# Patient Record
Sex: FEMALE | Race: BLACK OR AFRICAN AMERICAN | Employment: FULL TIME | URBAN - METROPOLITAN AREA
[De-identification: names, ages, dates, MRNs, and addresses within clinical notes are randomized per-mention and may not be internally consistent; named-entity substitution may affect disease eponyms.]

---

## 2019-07-17 ENCOUNTER — HOSPITAL ENCOUNTER (EMERGENCY)
Age: 39
Discharge: HOME | End: 2019-07-17
Attending: EMERGENCY MEDICINE
Payer: MEDICAID

## 2019-07-17 VITALS
WEIGHT: 173.06 LBS | DIASTOLIC BLOOD PRESSURE: 82 MMHG | OXYGEN SATURATION: 100 % | BODY MASS INDEX: 30.66 KG/M2 | SYSTOLIC BLOOD PRESSURE: 123 MMHG | RESPIRATION RATE: 18 BRPM | HEIGHT: 63 IN | HEART RATE: 74 BPM | TEMPERATURE: 98.8 F

## 2019-07-17 DIAGNOSIS — H65.02 ACUTE SEROUS OTITIS MEDIA OF LEFT EAR, RECURRENCE NOT SPECIFIED: Primary | ICD-10-CM

## 2019-07-17 LAB — S PYO AG THROAT QL: NEGATIVE

## 2019-07-17 PROCEDURE — 99283 EMERGENCY DEPT VISIT LOW MDM: CPT

## 2019-07-17 PROCEDURE — 6370000000 HC RX 637 (ALT 250 FOR IP): Performed by: EMERGENCY MEDICINE

## 2019-07-17 PROCEDURE — 87880 STREP A ASSAY W/OPTIC: CPT

## 2019-07-17 PROCEDURE — 87081 CULTURE SCREEN ONLY: CPT

## 2019-07-17 RX ORDER — AMOXICILLIN 250 MG/1
500 CAPSULE ORAL ONCE
Status: COMPLETED | OUTPATIENT
Start: 2019-07-17 | End: 2019-07-17

## 2019-07-17 RX ORDER — FLUCONAZOLE 150 MG/1
150 TABLET ORAL ONCE
Qty: 2 TABLET | Refills: 0 | Status: SHIPPED | OUTPATIENT
Start: 2019-07-17 | End: 2019-07-17

## 2019-07-17 RX ORDER — ACETAMINOPHEN 325 MG/1
650 TABLET ORAL ONCE
Status: COMPLETED | OUTPATIENT
Start: 2019-07-17 | End: 2019-07-17

## 2019-07-17 RX ORDER — AMOXICILLIN 500 MG/1
500 CAPSULE ORAL 3 TIMES DAILY
Qty: 30 CAPSULE | Refills: 0 | Status: SHIPPED | OUTPATIENT
Start: 2019-07-17 | End: 2019-07-27

## 2019-07-17 RX ADMIN — AMOXICILLIN 500 MG: 250 CAPSULE ORAL at 18:57

## 2019-07-17 RX ADMIN — ACETAMINOPHEN 650 MG: 325 TABLET ORAL at 18:57

## 2019-07-17 ASSESSMENT — PAIN SCALES - GENERAL
PAINLEVEL_OUTOF10: 8

## 2019-07-17 ASSESSMENT — ENCOUNTER SYMPTOMS
SHORTNESS OF BREATH: 0
DIARRHEA: 0
TROUBLE SWALLOWING: 0
VOICE CHANGE: 0
SORE THROAT: 1
VOMITING: 0
NAUSEA: 0

## 2019-07-17 ASSESSMENT — PAIN DESCRIPTION - PAIN TYPE
TYPE: ACUTE PAIN
TYPE: ACUTE PAIN

## 2019-07-17 ASSESSMENT — PAIN DESCRIPTION - DESCRIPTORS
DESCRIPTORS: BURNING;TENDER
DESCRIPTORS: ACHING;TENDER;THROBBING

## 2019-07-17 ASSESSMENT — PAIN DESCRIPTION - LOCATION
LOCATION: EAR;THROAT
LOCATION: EAR;THROAT

## 2019-07-17 ASSESSMENT — PAIN DESCRIPTION - ONSET
ONSET: GRADUAL
ONSET: GRADUAL

## 2019-07-17 ASSESSMENT — PAIN DESCRIPTION - FREQUENCY
FREQUENCY: CONTINUOUS
FREQUENCY: CONTINUOUS

## 2019-07-17 ASSESSMENT — PAIN DESCRIPTION - PROGRESSION
CLINICAL_PROGRESSION: NOT CHANGED
CLINICAL_PROGRESSION: GRADUALLY WORSENING

## 2019-07-17 ASSESSMENT — PAIN DESCRIPTION - ORIENTATION
ORIENTATION: LEFT
ORIENTATION: LEFT

## 2019-07-17 ASSESSMENT — PAIN - FUNCTIONAL ASSESSMENT: PAIN_FUNCTIONAL_ASSESSMENT: 0-10

## 2019-07-17 NOTE — ED PROVIDER NOTES
TUBAL LIGATION           CURRENT MEDICATIONS       Previous Medications    ALBUTEROL SULFATE HFA (PROVENTIL HFA) 108 (90 BASE) MCG/ACT INHALER    Inhale 2 puffs into the lungs every 6 hours as needed for Wheezing or Shortness of Breath May substitute ProAir MDI    MULTIPLE VITAMINS-MINERALS (THERAPEUTIC MULTIVITAMIN-MINERALS) TABLET    Take 1 tablet by mouth daily    PSEUDOEPHEDRINE-GUAIFENESIN (MUCINEX D)  MG PER EXTENDED RELEASE TABLET    Take 2 tablets by mouth every 12 hours as needed for Congestion       ALLERGIES     Percocet [oxycodone-acetaminophen] and Vicodin [hydrocodone-acetaminophen]    FAMILY HISTORY     History reviewed. No pertinent family history.        SOCIAL HISTORY       Social History     Socioeconomic History    Marital status: Single     Spouse name: None    Number of children: None    Years of education: None    Highest education level: None   Occupational History    None   Social Needs    Financial resource strain: None    Food insecurity:     Worry: None     Inability: None    Transportation needs:     Medical: None     Non-medical: None   Tobacco Use    Smoking status: Never Smoker    Smokeless tobacco: Never Used   Substance and Sexual Activity    Alcohol use: Yes     Comment: once a month    Drug use: No    Sexual activity: Yes     Partners: Male   Lifestyle    Physical activity:     Days per week: None     Minutes per session: None    Stress: None   Relationships    Social connections:     Talks on phone: None     Gets together: None     Attends Jewish service: None     Active member of club or organization: None     Attends meetings of clubs or organizations: None     Relationship status: None    Intimate partner violence:     Fear of current or ex partner: None     Emotionally abused: None     Physically abused: None     Forced sexual activity: None   Other Topics Concern    None   Social History Narrative    None         PHYSICAL EXAM    (up to 7 for level

## 2019-07-19 LAB — S PYO THROAT QL CULT: NORMAL

## 2019-08-01 ENCOUNTER — HOSPITAL ENCOUNTER (EMERGENCY)
Age: 39
Discharge: HOME | End: 2019-08-01
Attending: EMERGENCY MEDICINE
Payer: MEDICAID

## 2019-08-01 VITALS
WEIGHT: 170 LBS | RESPIRATION RATE: 16 BRPM | BODY MASS INDEX: 30.12 KG/M2 | HEIGHT: 63 IN | SYSTOLIC BLOOD PRESSURE: 131 MMHG | OXYGEN SATURATION: 99 % | HEART RATE: 81 BPM | TEMPERATURE: 98 F | DIASTOLIC BLOOD PRESSURE: 84 MMHG

## 2019-08-01 DIAGNOSIS — K08.89 DENTALGIA: Primary | ICD-10-CM

## 2019-08-01 PROCEDURE — 99282 EMERGENCY DEPT VISIT SF MDM: CPT

## 2019-08-01 PROCEDURE — 6370000000 HC RX 637 (ALT 250 FOR IP): Performed by: EMERGENCY MEDICINE

## 2019-08-01 PROCEDURE — 96372 THER/PROPH/DIAG INJ SC/IM: CPT

## 2019-08-01 PROCEDURE — 6360000002 HC RX W HCPCS: Performed by: EMERGENCY MEDICINE

## 2019-08-01 RX ORDER — PENICILLIN V POTASSIUM 250 MG/1
500 TABLET ORAL ONCE
Status: COMPLETED | OUTPATIENT
Start: 2019-08-01 | End: 2019-08-01

## 2019-08-01 RX ORDER — IBUPROFEN 600 MG/1
600 TABLET ORAL EVERY 6 HOURS PRN
Qty: 30 TABLET | Refills: 0 | Status: SHIPPED | OUTPATIENT
Start: 2019-08-01 | End: 2020-05-17

## 2019-08-01 RX ORDER — OXYCODONE HYDROCHLORIDE AND ACETAMINOPHEN 5; 325 MG/1; MG/1
1 TABLET ORAL EVERY 6 HOURS PRN
Qty: 4 TABLET | Refills: 0 | Status: SHIPPED | OUTPATIENT
Start: 2019-08-01 | End: 2019-08-04

## 2019-08-01 RX ORDER — KETOROLAC TROMETHAMINE 30 MG/ML
30 INJECTION, SOLUTION INTRAMUSCULAR; INTRAVENOUS ONCE
Status: COMPLETED | OUTPATIENT
Start: 2019-08-01 | End: 2019-08-01

## 2019-08-01 RX ORDER — PENICILLIN V POTASSIUM 500 MG/1
500 TABLET ORAL 4 TIMES DAILY
Qty: 40 TABLET | Refills: 0 | Status: SHIPPED | OUTPATIENT
Start: 2019-08-01 | End: 2019-08-11

## 2019-08-01 RX ADMIN — KETOROLAC TROMETHAMINE 30 MG: 30 INJECTION, SOLUTION INTRAMUSCULAR at 05:22

## 2019-08-01 RX ADMIN — PENICILLIN V POTASIUM 500 MG: 250 TABLET ORAL at 05:22

## 2019-08-01 ASSESSMENT — PAIN DESCRIPTION - ONSET
ONSET: GRADUAL
ONSET: GRADUAL

## 2019-08-01 ASSESSMENT — PAIN - FUNCTIONAL ASSESSMENT
PAIN_FUNCTIONAL_ASSESSMENT: ACTIVITIES ARE NOT PREVENTED
PAIN_FUNCTIONAL_ASSESSMENT: 0-10
PAIN_FUNCTIONAL_ASSESSMENT: ACTIVITIES ARE NOT PREVENTED

## 2019-08-01 ASSESSMENT — PAIN DESCRIPTION - FREQUENCY
FREQUENCY: CONTINUOUS
FREQUENCY: CONTINUOUS

## 2019-08-01 ASSESSMENT — PAIN DESCRIPTION - PROGRESSION
CLINICAL_PROGRESSION: RAPIDLY IMPROVING
CLINICAL_PROGRESSION: NOT CHANGED

## 2019-08-01 ASSESSMENT — PAIN SCALES - GENERAL
PAINLEVEL_OUTOF10: 10
PAINLEVEL_OUTOF10: 7
PAINLEVEL_OUTOF10: 10

## 2019-08-01 ASSESSMENT — PAIN DESCRIPTION - ORIENTATION
ORIENTATION: LEFT;LOWER
ORIENTATION: LEFT;LOWER

## 2019-08-01 ASSESSMENT — PAIN DESCRIPTION - PAIN TYPE
TYPE: ACUTE PAIN
TYPE: ACUTE PAIN

## 2019-08-01 ASSESSMENT — PAIN DESCRIPTION - LOCATION
LOCATION: TEETH
LOCATION: TEETH

## 2019-08-01 ASSESSMENT — PAIN DESCRIPTION - DESCRIPTORS
DESCRIPTORS: ACHING
DESCRIPTORS: ACHING

## 2019-08-01 NOTE — ED PROVIDER NOTES
Triage Chief Complaint:    Dental Pain (left lower)    [place]:  Rosemary Gooden is a 41 y.o. female that presents to the emergency department complaints of having left jaw pain. The patient has a history of having tooth problems, and states that she tried to have the tooth is infected at the present time it removed by a dentist.  Patient states that the dentist could not remove it and tried to \"push it back into place\". Patient states that she is having pain in that area at the present time is having difficulty chewing and swallowing. Patient states it hurts to do so. Patient denies any difficulty breathing. .    ROS:  At least 10 systems reviewed and otherwise acutely negative except as in the [address] Past Medical History:   Diagnosis Date    Anemia     Thyroid nodule      Past Surgical History:   Procedure Laterality Date     SECTION  2011    TUBAL LIGATION       History reviewed. No pertinent family history.   Social History     Socioeconomic History    Marital status: Single     Spouse name: Not on file    Number of children: Not on file    Years of education: Not on file    Highest education level: Not on file   Occupational History    Not on file   Social Needs    Financial resource strain: Not on file    Food insecurity:     Worry: Not on file     Inability: Not on file    Transportation needs:     Medical: Not on file     Non-medical: Not on file   Tobacco Use    Smoking status: Never Smoker    Smokeless tobacco: Never Used   Substance and Sexual Activity    Alcohol use: Yes     Comment: once a month    Drug use: No    Sexual activity: Yes     Partners: Male   Lifestyle    Physical activity:     Days per week: Not on file     Minutes per session: Not on file    Stress: Not on file   Relationships    Social connections:     Talks on phone: Not on file     Gets together: Not on file     Attends Anabaptism service: Not on file     Active member of club or organization: Not on file

## 2020-05-17 ENCOUNTER — HOSPITAL ENCOUNTER (EMERGENCY)
Age: 40
Discharge: HOME | End: 2020-05-17
Attending: EMERGENCY MEDICINE
Payer: MEDICAID

## 2020-05-17 VITALS
TEMPERATURE: 98.7 F | DIASTOLIC BLOOD PRESSURE: 75 MMHG | HEIGHT: 63 IN | BODY MASS INDEX: 30 KG/M2 | WEIGHT: 169.31 LBS | RESPIRATION RATE: 12 BRPM | SYSTOLIC BLOOD PRESSURE: 122 MMHG | HEART RATE: 75 BPM | OXYGEN SATURATION: 99 %

## 2020-05-17 LAB
AMORPHOUS: ABNORMAL /HPF
BACTERIA: ABNORMAL /HPF
BILIRUBIN URINE: ABNORMAL
BLOOD, URINE: ABNORMAL
CLARITY: ABNORMAL
COLOR: ABNORMAL
EPITHELIAL CELLS, UA: ABNORMAL /HPF (ref 0–5)
GLUCOSE URINE: 100 MG/DL
HCG(URINE) PREGNANCY TEST: NEGATIVE
KETONES, URINE: NEGATIVE MG/DL
LEUKOCYTE ESTERASE, URINE: ABNORMAL
MICROSCOPIC EXAMINATION: YES
MUCUS: ABNORMAL /LPF
NITRITE, URINE: POSITIVE
PH UA: 5.5 (ref 5–8)
PROTEIN UA: 30 MG/DL
RBC UA: ABNORMAL /HPF (ref 0–4)
SPECIFIC GRAVITY UA: >=1.03 (ref 1–1.03)
URINE REFLEX TO CULTURE: YES
URINE TYPE: ABNORMAL
UROBILINOGEN, URINE: 2 E.U./DL
WBC UA: >100 /HPF (ref 0–5)

## 2020-05-17 PROCEDURE — 99283 EMERGENCY DEPT VISIT LOW MDM: CPT

## 2020-05-17 PROCEDURE — 81001 URINALYSIS AUTO W/SCOPE: CPT

## 2020-05-17 PROCEDURE — 84703 CHORIONIC GONADOTROPIN ASSAY: CPT

## 2020-05-17 PROCEDURE — 6370000000 HC RX 637 (ALT 250 FOR IP): Performed by: EMERGENCY MEDICINE

## 2020-05-17 PROCEDURE — 87086 URINE CULTURE/COLONY COUNT: CPT

## 2020-05-17 RX ORDER — IBUPROFEN 600 MG/1
600 TABLET ORAL EVERY 8 HOURS PRN
Qty: 20 TABLET | Refills: 0 | OUTPATIENT
Start: 2020-05-17 | End: 2021-03-28

## 2020-05-17 RX ORDER — CIPROFLOXACIN 500 MG/1
500 TABLET, FILM COATED ORAL ONCE
Status: COMPLETED | OUTPATIENT
Start: 2020-05-17 | End: 2020-05-17

## 2020-05-17 RX ORDER — CIPROFLOXACIN 500 MG/1
500 TABLET, FILM COATED ORAL 2 TIMES DAILY
Qty: 20 TABLET | Refills: 0 | Status: SHIPPED | OUTPATIENT
Start: 2020-05-17 | End: 2020-05-27

## 2020-05-17 RX ORDER — ACETAMINOPHEN 325 MG/1
650 TABLET ORAL EVERY 6 HOURS PRN
Qty: 49 TABLET | Refills: 3 | OUTPATIENT
Start: 2020-05-17 | End: 2021-03-28

## 2020-05-17 RX ADMIN — CIPROFLOXACIN 500 MG: 500 TABLET, FILM COATED ORAL at 08:45

## 2020-05-17 ASSESSMENT — ENCOUNTER SYMPTOMS
EYE REDNESS: 0
CHOKING: 0
EYE PAIN: 0
ABDOMINAL DISTENTION: 0
PHOTOPHOBIA: 0
SHORTNESS OF BREATH: 0
APNEA: 0
STRIDOR: 0
CHEST TIGHTNESS: 0
COUGH: 0
EYE DISCHARGE: 0
WHEEZING: 0
EYE ITCHING: 0

## 2020-05-17 NOTE — ED PROVIDER NOTES
the review of systems was reviewed and negative. PAST MEDICAL HISTORY     Past Medical History:   Diagnosis Date    Anemia     Thyroid nodule        SURGICAL HISTORY       Past Surgical History:   Procedure Laterality Date     SECTION  2011    TUBAL LIGATION         CURRENT MEDICATIONS       Previous Medications    MULTIPLE VITAMINS-MINERALS (THERAPEUTIC MULTIVITAMIN-MINERALS) TABLET    Take 1 tablet by mouth daily       ALLERGIES     Percocet [oxycodone-acetaminophen] and Vicodin [hydrocodone-acetaminophen]    FAMILY HISTORY      History reviewed. No pertinent family history.     SOCIAL HISTORY       Social History     Socioeconomic History    Marital status: Single     Spouse name: None    Number of children: None    Years of education: None    Highest education level: None   Occupational History    None   Social Needs    Financial resource strain: None    Food insecurity     Worry: None     Inability: None    Transportation needs     Medical: None     Non-medical: None   Tobacco Use    Smoking status: Never Smoker    Smokeless tobacco: Never Used   Substance and Sexual Activity    Alcohol use: Yes     Comment: once a month    Drug use: No    Sexual activity: Yes     Partners: Male   Lifestyle    Physical activity     Days per week: None     Minutes per session: None    Stress: None   Relationships    Social connections     Talks on phone: None     Gets together: None     Attends Jainism service: None     Active member of club or organization: None     Attends meetings of clubs or organizations: None     Relationship status: None    Intimate partner violence     Fear of current or ex partner: None     Emotionally abused: None     Physically abused: None     Forced sexual activity: None   Other Topics Concern    None   Social History Narrative    None       PHYSICAL EXAM       ED Triage Vitals [20 0829]   BP Temp Temp Source Pulse Resp SpO2 Height Weight   122/75 98.7 °F

## 2020-05-18 LAB — URINE CULTURE, ROUTINE: NORMAL

## 2020-06-22 ENCOUNTER — TELEPHONE (OUTPATIENT)
Dept: GYNECOLOGY | Age: 40
End: 2020-06-22

## 2020-06-25 ENCOUNTER — TELEPHONE (OUTPATIENT)
Dept: GYNECOLOGY | Age: 40
End: 2020-06-25

## 2020-06-25 NOTE — TELEPHONE ENCOUNTER
Called patient, left a message for a return call. Called patient to get her on schedule to be seen next week.

## 2020-06-25 NOTE — TELEPHONE ENCOUNTER
Patient returned Tigist's phone call and was scheduled for Monday, June 29th at 10:40 am in [place].

## 2020-06-29 ENCOUNTER — OFFICE VISIT (OUTPATIENT)
Dept: GYNECOLOGY | Age: 40
End: 2020-06-29
Payer: MEDICAID

## 2020-06-29 VITALS
HEIGHT: 63 IN | RESPIRATION RATE: 17 BRPM | DIASTOLIC BLOOD PRESSURE: 69 MMHG | TEMPERATURE: 97.7 F | SYSTOLIC BLOOD PRESSURE: 126 MMHG | WEIGHT: 166 LBS | HEART RATE: 65 BPM | BODY MASS INDEX: 29.41 KG/M2

## 2020-06-29 DIAGNOSIS — R23.2 HOT FLASHES: ICD-10-CM

## 2020-06-29 LAB
A/G RATIO: 1.9 (ref 1.1–2.2)
ALBUMIN SERPL-MCNC: 4.9 G/DL (ref 3.4–5)
ALP BLD-CCNC: 102 U/L (ref 40–129)
ALT SERPL-CCNC: 38 U/L (ref 10–40)
ANION GAP SERPL CALCULATED.3IONS-SCNC: 15 MMOL/L (ref 3–16)
AST SERPL-CCNC: 75 U/L (ref 15–37)
BILIRUB SERPL-MCNC: 0.3 MG/DL (ref 0–1)
BUN BLDV-MCNC: 9 MG/DL (ref 7–20)
CALCIUM SERPL-MCNC: 9.6 MG/DL (ref 8.3–10.6)
CHLORIDE BLD-SCNC: 101 MMOL/L (ref 99–110)
CO2: 25 MMOL/L (ref 21–32)
CREAT SERPL-MCNC: 0.5 MG/DL (ref 0.6–1.1)
ESTRADIOL LEVEL: 115 PG/ML
FOLLICLE STIMULATING HORMONE: 4.3 MIU/ML
GFR AFRICAN AMERICAN: >60
GFR NON-AFRICAN AMERICAN: >60
GLOBULIN: 2.6 G/DL
GLUCOSE BLD-MCNC: 90 MG/DL (ref 70–99)
HCT VFR BLD CALC: 39 % (ref 36–48)
HEMOGLOBIN: 12.9 G/DL (ref 12–16)
MCH RBC QN AUTO: 29.3 PG (ref 26–34)
MCHC RBC AUTO-ENTMCNC: 33.1 G/DL (ref 31–36)
MCV RBC AUTO: 88.7 FL (ref 80–100)
PDW BLD-RTO: 14.5 % (ref 12.4–15.4)
PLATELET # BLD: 285 K/UL (ref 135–450)
PMV BLD AUTO: 8.3 FL (ref 5–10.5)
POTASSIUM SERPL-SCNC: 4.6 MMOL/L (ref 3.5–5.1)
PROGESTERONE LEVEL: <0.05 NG/ML
RBC # BLD: 4.4 M/UL (ref 4–5.2)
SODIUM BLD-SCNC: 141 MMOL/L (ref 136–145)
TOTAL PROTEIN: 7.5 G/DL (ref 6.4–8.2)
TSH SERPL DL<=0.05 MIU/L-ACNC: 1.05 UIU/ML (ref 0.27–4.2)
WBC # BLD: 6.4 K/UL (ref 4–11)

## 2020-06-29 PROCEDURE — 99395 PREV VISIT EST AGE 18-39: CPT | Performed by: OBSTETRICS & GYNECOLOGY

## 2020-06-29 ASSESSMENT — ENCOUNTER SYMPTOMS
GASTROINTESTINAL NEGATIVE: 1
RESPIRATORY NEGATIVE: 1
EYES NEGATIVE: 1

## 2020-06-30 LAB
BACTERIA WET PREP: ABNORMAL
CLUE CELLS: ABNORMAL
EPITHELIAL CELLS WET PREP: ABNORMAL
RBC WET PREP: ABNORMAL
SOURCE WET PREP: ABNORMAL
TRICHOMONAS PREP: ABNORMAL
WBC WET PREP: ABNORMAL
YEAST WET PREP: ABNORMAL

## 2020-06-30 NOTE — PROGRESS NOTES
Subjective:      Patient ID: Ashley Hayes is a 46 y.o. female. Patient  Is here for annual. Patient complains of vaginal dryness and hot flashes. Still with normal  Cycles. Gynecologic Exam         Review of Systems   Constitutional: Negative. HENT: Negative. Eyes: Negative. Respiratory: Negative. Cardiovascular: Negative. Gastrointestinal: Negative. Genitourinary: Negative. Musculoskeletal: Negative. Skin: Negative. Neurological: Negative. Psychiatric/Behavioral: Negative.       Date of Birth 1980  Past Medical History:   Diagnosis Date    Abnormal Pap smear of cervix     Anemia     Bacterial vaginosis     Fibroid     Irregular uterine bleeding     Thyroid nodule      Past Surgical History:   Procedure Laterality Date    BREAST BIOPSY      BREAST LUMPECTOMY       SECTION  2011    ENDOMETRIAL ABLATION      MYOMECTOMY  2015    TUBAL LIGATION       OB History    Para Term  AB Living   5 5 5     5   SAB TAB Ectopic Molar Multiple Live Births                    # Outcome Date GA Lbr Reuben/2nd Weight Sex Delivery Anes PTL Lv   5 Term     M CS-LVertical         Complications: Heart rate slow   4 Term     M       3 Term     M CS-LTranv         Complications: Oxygen decrease   2 Term     F CS-LTranv      1 Term     M CS-LTranv        Social History     Socioeconomic History    Marital status: Single     Spouse name: Not on file    Number of children: Not on file    Years of education: Not on file    Highest education level: Not on file   Occupational History    Not on file   Social Needs    Financial resource strain: Not on file    Food insecurity     Worry: Not on file     Inability: Not on file    Transportation needs     Medical: Not on file     Non-medical: Not on file   Tobacco Use    Smoking status: Never Smoker    Smokeless tobacco: Never Used   Substance and Sexual Activity    Alcohol use: Yes     Comment: once a month    Drug use: No    Sexual activity: Yes     Partners: Male   Lifestyle    Physical activity     Days per week: Not on file     Minutes per session: Not on file    Stress: Not on file   Relationships    Social connections     Talks on phone: Not on file     Gets together: Not on file     Attends Yazidism service: Not on file     Active member of club or organization: Not on file     Attends meetings of clubs or organizations: Not on file     Relationship status: Not on file    Intimate partner violence     Fear of current or ex partner: Not on file     Emotionally abused: Not on file     Physically abused: Not on file     Forced sexual activity: Not on file   Other Topics Concern    Not on file   Social History Narrative    Not on file     Allergies   Allergen Reactions    Percocet [Oxycodone-Acetaminophen] Nausea And Vomiting    Vicodin [Hydrocodone-Acetaminophen] Nausea And Vomiting     Outpatient Medications Marked as Taking for the 6/29/20 encounter (Office Visit) with Oumar Estevez MD   Medication Sig Dispense Refill    Ospemifene 60 MG TABS Take 1 tablet by mouth daily 30 tablet 5     Family History   Family history unknown: Yes     /69 (Site: Right Upper Arm, Position: Sitting, Cuff Size: Large Adult)   Pulse 65   Temp 97.7 °F (36.5 °C) (Oral)   Resp 17   Ht 5' 3\" (1.6 m)   Wt 166 lb (75.3 kg)   LMP 06/22/2020   BMI 29.41 kg/m²       Objective:   Physical Exam  Constitutional:       Appearance: Normal appearance. She is well-developed and normal weight. HENT:      Head: Normocephalic. Nose: Nose normal.      Mouth/Throat:      Mouth: Mucous membranes are moist.      Pharynx: Oropharynx is clear. Eyes:      Pupils: Pupils are equal, round, and reactive to light. Neck:      Musculoskeletal: Normal range of motion and neck supple. No neck rigidity. Thyroid: No thyromegaly. Cardiovascular:      Rate and Rhythm: Normal rate and regular rhythm. Pulses: Normal pulses. and dry. Coloration: Skin is not pale. Findings: No erythema or rash. Neurological:      General: No focal deficit present. Mental Status: She is alert and oriented to person, place, and time. Mental status is at baseline. Deep Tendon Reflexes: Reflexes are normal and symmetric. Psychiatric:         Mood and Affect: Mood normal.         Behavior: Behavior normal.         Thought Content: Thought content normal.         Judgment: Judgment normal.         Assessment:      1. Annual  2. Hot flashes  3. Vaginal dryness      Plan:      1. Pap, calcium,  Exercise  2 and  3. Check labs.  intrarosa to try, if that does not work, then  MD Jack

## 2020-07-01 LAB — TESTOSTERONE TOTAL: 7 NG/DL (ref 20–70)

## 2020-07-02 DIAGNOSIS — B37.9 YEAST INFECTION: Primary | ICD-10-CM

## 2020-07-02 DIAGNOSIS — B96.89 BV (BACTERIAL VAGINOSIS): ICD-10-CM

## 2020-07-02 DIAGNOSIS — N76.0 BV (BACTERIAL VAGINOSIS): ICD-10-CM

## 2020-07-02 LAB
GENITAL CULTURE, ROUTINE: NORMAL
HPV COMMENT: NORMAL
HPV TYPE 16: NOT DETECTED
HPV TYPE 18: NOT DETECTED
HPVOH (OTHER TYPES): NOT DETECTED

## 2020-07-02 RX ORDER — FLUCONAZOLE 150 MG/1
150 TABLET ORAL ONCE
Qty: 1 TABLET | Refills: 1 | OUTPATIENT
Start: 2020-07-02 | End: 2020-07-02

## 2020-07-02 RX ORDER — CLINDAMYCIN PHOSPHATE 11.9 MG/ML
SOLUTION TOPICAL
Qty: 30 ML | Refills: 0 | OUTPATIENT
Start: 2020-07-02 | End: 2020-09-03

## 2020-07-06 ENCOUNTER — TELEPHONE (OUTPATIENT)
Dept: GYNECOLOGY | Age: 40
End: 2020-07-06

## 2020-07-09 ENCOUNTER — TELEPHONE (OUTPATIENT)
Dept: GYNECOLOGY | Age: 40
End: 2020-07-09

## 2020-07-09 NOTE — TELEPHONE ENCOUNTER
Call patient that her testosterone level is low. Tell patient that she is most likely starting perimenopause. Her other labs are normal. Tell patient that she should try that Intrarosa I gave her. I think that may help.

## 2020-07-09 NOTE — TELEPHONE ENCOUNTER
Patient was given her pap results but not her blood work results. Please call patient with her blood results.   Patient can be reached at [phone]

## 2020-07-21 ENCOUNTER — TELEPHONE (OUTPATIENT)
Dept: GYNECOLOGY | Age: 40
End: 2020-07-21

## 2020-07-21 NOTE — TELEPHONE ENCOUNTER
Patient was given samples of   Intrsa. Patient broke out on stomach and legs with hives. Patient also, states she is passing large clots during her period. Patient does not feel that this medication is working for her. Patient would like an appointment but will do a Virtual Visit.   [id number]  Patient does not have [facility] [address], [address] [facility]

## 2020-07-21 NOTE — TELEPHONE ENCOUNTER
Called and left message for patient, to stop taking medication per Dr. Caesar Almendarez.  Asked patient to  Return call  To the office

## 2020-07-23 NOTE — TELEPHONE ENCOUNTER
Patient returned call, gave her message from Dr. Arjun Scanlon patient states that she is interesting in the compound medication with no hormones. And patient is aware about stop taking her medication. Informed patient that Arjun Guidry wanted her to get and US done. Placed order in system, gave patient number to schedule her US.

## 2020-07-30 ENCOUNTER — TELEPHONE (OUTPATIENT)
Dept: GYNECOLOGY | Age: 40
End: 2020-07-30

## 2020-07-30 NOTE — TELEPHONE ENCOUNTER
Not sure what the compound medicine was that Dr Cliff Alcala was discussing. She should return on Monday to address.

## 2020-07-30 NOTE — TELEPHONE ENCOUNTER
Patient is calling re: was her compound medication called into the pharmacy? Please call patient at [phone]  Patient aware that Dr. Andreea Delacruz is out of office until next week.

## 2020-09-03 ENCOUNTER — APPOINTMENT (OUTPATIENT)
Dept: GENERAL RADIOLOGY | Age: 40
End: 2020-09-03
Payer: MEDICAID

## 2020-09-03 ENCOUNTER — HOSPITAL ENCOUNTER (EMERGENCY)
Age: 40
Discharge: HOME | End: 2020-09-03
Attending: EMERGENCY MEDICINE
Payer: MEDICAID

## 2020-09-03 VITALS
TEMPERATURE: 98.5 F | WEIGHT: 167 LBS | OXYGEN SATURATION: 99 % | HEART RATE: 56 BPM | SYSTOLIC BLOOD PRESSURE: 138 MMHG | HEIGHT: 63 IN | RESPIRATION RATE: 29 BRPM | BODY MASS INDEX: 29.59 KG/M2 | DIASTOLIC BLOOD PRESSURE: 119 MMHG

## 2020-09-03 LAB
A/G RATIO: 1.3 (ref 1.1–2.2)
ALBUMIN SERPL-MCNC: 4.3 G/DL (ref 3.4–5)
ALP BLD-CCNC: 91 U/L (ref 40–129)
ALT SERPL-CCNC: 16 U/L (ref 10–40)
ANION GAP SERPL CALCULATED.3IONS-SCNC: 12 MMOL/L (ref 3–16)
APTT: 29.8 SEC (ref 24.2–36.2)
AST SERPL-CCNC: 24 U/L (ref 15–37)
BACTERIA: ABNORMAL /HPF
BASOPHILS ABSOLUTE: 0 K/UL (ref 0–0.2)
BASOPHILS RELATIVE PERCENT: 0.6 %
BILIRUB SERPL-MCNC: <0.2 MG/DL (ref 0–1)
BILIRUBIN URINE: NEGATIVE
BLOOD, URINE: ABNORMAL
BUN BLDV-MCNC: 12 MG/DL (ref 7–20)
CALCIUM SERPL-MCNC: 9.5 MG/DL (ref 8.3–10.6)
CHLORIDE BLD-SCNC: 100 MMOL/L (ref 99–110)
CLARITY: CLEAR
CO2: 27 MMOL/L (ref 21–32)
COLOR: YELLOW
CREAT SERPL-MCNC: 0.6 MG/DL (ref 0.6–1.1)
D DIMER: <200 NG/ML DDU (ref 0–229)
EKG ATRIAL RATE: 55 BPM
EKG DIAGNOSIS: NORMAL
EKG P AXIS: 47 DEGREES
EKG P-R INTERVAL: 194 MS
EKG Q-T INTERVAL: 446 MS
EKG QRS DURATION: 84 MS
EKG QTC CALCULATION (BAZETT): 426 MS
EKG R AXIS: 70 DEGREES
EKG T AXIS: 48 DEGREES
EKG VENTRICULAR RATE: 55 BPM
EOSINOPHILS ABSOLUTE: 0.1 K/UL (ref 0–0.6)
EOSINOPHILS RELATIVE PERCENT: 1.1 %
EPITHELIAL CELLS, UA: ABNORMAL /HPF (ref 0–5)
GFR AFRICAN AMERICAN: >60
GFR NON-AFRICAN AMERICAN: >60
GLOBULIN: 3.2 G/DL
GLUCOSE BLD-MCNC: 115 MG/DL (ref 70–99)
GLUCOSE URINE: NEGATIVE MG/DL
GONADOTROPIN, CHORIONIC (HCG) QUANT: <5 MIU/ML
HCT VFR BLD CALC: 34 % (ref 36–48)
HEMOGLOBIN: 11 G/DL (ref 12–16)
INR BLD: 1.03 (ref 0.86–1.14)
KETONES, URINE: NEGATIVE MG/DL
LEUKOCYTE ESTERASE, URINE: ABNORMAL
LYMPHOCYTES ABSOLUTE: 2.7 K/UL (ref 1–5.1)
LYMPHOCYTES RELATIVE PERCENT: 36.3 %
MCH RBC QN AUTO: 27.4 PG (ref 26–34)
MCHC RBC AUTO-ENTMCNC: 32.4 G/DL (ref 31–36)
MCV RBC AUTO: 84.6 FL (ref 80–100)
MICROSCOPIC EXAMINATION: YES
MONOCYTES ABSOLUTE: 0.5 K/UL (ref 0–1.3)
MONOCYTES RELATIVE PERCENT: 7.1 %
MUCUS: ABNORMAL /LPF
NEUTROPHILS ABSOLUTE: 4 K/UL (ref 1.7–7.7)
NEUTROPHILS RELATIVE PERCENT: 54.9 %
NITRITE, URINE: NEGATIVE
PDW BLD-RTO: 14.9 % (ref 12.4–15.4)
PH UA: 6 (ref 5–8)
PLATELET # BLD: 265 K/UL (ref 135–450)
PMV BLD AUTO: 8.2 FL (ref 5–10.5)
POTASSIUM SERPL-SCNC: 4 MMOL/L (ref 3.5–5.1)
PROTEIN UA: NEGATIVE MG/DL
PROTHROMBIN TIME: 11.9 SEC (ref 10–13.2)
RBC # BLD: 4.02 M/UL (ref 4–5.2)
RBC UA: ABNORMAL /HPF (ref 0–4)
SODIUM BLD-SCNC: 139 MMOL/L (ref 136–145)
SPECIFIC GRAVITY UA: 1.02 (ref 1–1.03)
TOTAL PROTEIN: 7.5 G/DL (ref 6.4–8.2)
TRICHOMONAS: ABNORMAL /HPF
TROPONIN: <0.01 NG/ML
URINE REFLEX TO CULTURE: YES
URINE TYPE: ABNORMAL
UROBILINOGEN, URINE: 0.2 E.U./DL
WBC # BLD: 7.3 K/UL (ref 4–11)
WBC UA: ABNORMAL /HPF (ref 0–5)

## 2020-09-03 PROCEDURE — 85730 THROMBOPLASTIN TIME PARTIAL: CPT

## 2020-09-03 PROCEDURE — 84484 ASSAY OF TROPONIN QUANT: CPT

## 2020-09-03 PROCEDURE — 80053 COMPREHEN METABOLIC PANEL: CPT

## 2020-09-03 PROCEDURE — 99285 EMERGENCY DEPT VISIT HI MDM: CPT

## 2020-09-03 PROCEDURE — 87077 CULTURE AEROBIC IDENTIFY: CPT

## 2020-09-03 PROCEDURE — 93005 ELECTROCARDIOGRAM TRACING: CPT | Performed by: EMERGENCY MEDICINE

## 2020-09-03 PROCEDURE — 84702 CHORIONIC GONADOTROPIN TEST: CPT

## 2020-09-03 PROCEDURE — 6370000000 HC RX 637 (ALT 250 FOR IP): Performed by: EMERGENCY MEDICINE

## 2020-09-03 PROCEDURE — 93010 ELECTROCARDIOGRAM REPORT: CPT | Performed by: INTERNAL MEDICINE

## 2020-09-03 PROCEDURE — 87086 URINE CULTURE/COLONY COUNT: CPT

## 2020-09-03 PROCEDURE — 85379 FIBRIN DEGRADATION QUANT: CPT

## 2020-09-03 PROCEDURE — 85610 PROTHROMBIN TIME: CPT

## 2020-09-03 PROCEDURE — 81001 URINALYSIS AUTO W/SCOPE: CPT

## 2020-09-03 PROCEDURE — 85025 COMPLETE CBC W/AUTO DIFF WBC: CPT

## 2020-09-03 PROCEDURE — 71046 X-RAY EXAM CHEST 2 VIEWS: CPT

## 2020-09-03 PROCEDURE — 36415 COLL VENOUS BLD VENIPUNCTURE: CPT

## 2020-09-03 RX ORDER — ASPIRIN 81 MG/1
324 TABLET, CHEWABLE ORAL ONCE
Status: COMPLETED | OUTPATIENT
Start: 2020-09-03 | End: 2020-09-03

## 2020-09-03 RX ADMIN — ASPIRIN 324 MG: 81 TABLET, CHEWABLE ORAL at 06:34

## 2020-09-03 ASSESSMENT — PAIN DESCRIPTION - DESCRIPTORS: DESCRIPTORS: BURNING

## 2020-09-03 ASSESSMENT — PAIN DESCRIPTION - PAIN TYPE: TYPE: ACUTE PAIN

## 2020-09-03 ASSESSMENT — PAIN DESCRIPTION - ORIENTATION: ORIENTATION: MID

## 2020-09-03 ASSESSMENT — PAIN DESCRIPTION - ONSET: ONSET: ON-GOING

## 2020-09-03 ASSESSMENT — PAIN DESCRIPTION - FREQUENCY: FREQUENCY: CONTINUOUS

## 2020-09-03 ASSESSMENT — PAIN DESCRIPTION - PROGRESSION: CLINICAL_PROGRESSION: GRADUALLY IMPROVING

## 2020-09-03 ASSESSMENT — PAIN DESCRIPTION - LOCATION: LOCATION: CHEST

## 2020-09-03 ASSESSMENT — PAIN SCALES - GENERAL
PAINLEVEL_OUTOF10: 8
PAINLEVEL_OUTOF10: 7

## 2020-09-03 NOTE — ED NOTES
Acknowledged pt by pt's name. Verified pt by name and date of birth. Checked arm band, allergies, reviewed past medical history. Introduced myself to patient  Duration of ED plan of care explained to patient  Explained planned tests and procedures  Thanked patient for coming to [facility].    Asked if there was anything else I could do for the patient before exiting room. CB in reach.      Britt Pitts RN  09/03/20 [id number]

## 2020-09-03 NOTE — ED PROVIDER NOTES
Triage Chief Complaint:   Chest Pain (since 2100 last night)    PEGGY Agarwal is a 45 y.o. female that presents for evaluation of atraumatic chest pain that she describes as central which started last night when she was lying in bed around 9 PM.  The patient is on Ospemifene. She denies smoking, diabetes, known cardiac history for herself. No fever no chills no headache no leg swelling. Care Everywhere shows she had a  normal stress test.    ROS:  At least 12 systems reviewed and otherwise acutely negative except as in the [address]     Past Medical History:   Diagnosis Date    Abnormal Pap smear of cervix     Anemia     Bacterial vaginosis     Fibroid     Irregular uterine bleeding     Thyroid nodule      Past Surgical History:   Procedure Laterality Date    BREAST BIOPSY      BREAST LUMPECTOMY       SECTION  2011    ENDOMETRIAL ABLATION      MYOMECTOMY  2015    TUBAL LIGATION       Family History   Family history unknown: Yes     Social History     Socioeconomic History    Marital status: Single     Spouse name: Not on file    Number of children: Not on file    Years of education: Not on file    Highest education level: Not on file   Occupational History    Not on file   Social Needs    Financial resource strain: Not on file    Food insecurity     Worry: Not on file     Inability: Not on file    Transportation needs     Medical: Not on file     Non-medical: Not on file   Tobacco Use    Smoking status: Never Smoker    Smokeless tobacco: Never Used   Substance and Sexual Activity    Alcohol use: Yes     Comment: once a month    Drug use: No    Sexual activity: Yes     Partners: Male   Lifestyle    Physical activity     Days per week: Not on file     Minutes per session: Not on file    Stress: Not on file   Relationships    Social connections     Talks on phone: Not on file     Gets together: Not on file     Attends Judaism service: Not on file     Active member of club or organization: Not on file     Attends meetings of clubs or organizations: Not on file     Relationship status: Not on file    Intimate partner violence     Fear of current or ex partner: Not on file     Emotionally abused: Not on file     Physically abused: Not on file     Forced sexual activity: Not on file   Other Topics Concern    Not on file   Social History Narrative    Not on file     No current facility-administered medications for this encounter. Current Outpatient Medications   Medication Sig Dispense Refill    Ospemifene 60 MG TABS Take 1 tablet by mouth daily 30 tablet 5    ibuprofen (IBU) 600 MG tablet Take 1 tablet by mouth every 8 hours as needed for Pain (Patient not taking: Reported on 6/29/2020) 20 tablet 0    acetaminophen (TYLENOL) 325 MG tablet Take 2 tablets by mouth every 6 hours as needed for Pain (Patient not taking: Reported on 6/29/2020) 49 tablet 3    Multiple Vitamins-Minerals (THERAPEUTIC MULTIVITAMIN-MINERALS) tablet Take 1 tablet by mouth daily       Allergies   Allergen Reactions    Dhea [Nutritional Supplements] Hives     intrarosa  Vaginal supplement    Percocet [Oxycodone-Acetaminophen] Nausea And Vomiting    Vicodin [Hydrocodone-Acetaminophen] Nausea And Vomiting       [unfilled]    Nursing Notes Reviewed    Physical Exam:  Vitals:    09/03/20 0617   BP: (!) 148/100   Pulse: 54   Resp: 16   Temp: 98.5 °F (36.9 °C)   SpO2: 100%       GENERAL APPEARANCE: Awake and alert. Cooperative. No acute distress. HEAD: Normocephalic. Atraumatic. EYES: EOM's grossly intact. Sclera anicteric. ENT: Mucous membranes are moist. Tolerates saliva. No trismus. NECK: Supple. No meningismus. Trachea midline. HEART: RRR. Radial pulses 2+. LUNGS: Respirations unlabored. CTAB  ABDOMEN: Soft. Non-tender. No guarding or rebound. EXTREMITIES: No acute deformities. SKIN: Warm and dry. NEUROLOGICAL: No gross facial drooping.  Moves all 4 extremities spontaneously. PSYCHIATRIC: Normal mood. I have reviewed and interpreted all of the currently available lab results from this visit (if applicable):  Results for orders placed or performed during the hospital encounter of 09/03/20   Urine reflex to culture    Specimen: Urine, clean catch   Result Value Ref Range    Color, UA Yellow Straw/Yellow    Clarity, UA Clear Clear    Glucose, Ur Negative Negative mg/dL    Bilirubin Urine Negative Negative    Ketones, Urine Negative Negative mg/dL    Specific Gravity, UA 1.025 1.005 - 1.030    Blood, Urine MODERATE (A) Negative    pH, UA 6.0 5.0 - 8.0    Protein, UA Negative Negative mg/dL    Urobilinogen, Urine 0.2 <2.0 E.U./dL    Nitrite, Urine Negative Negative    Leukocyte Esterase, Urine SMALL (A) Negative    Microscopic Examination YES     Urine Type Cleancatch         Radiographs (if obtained):  [] The following radiograph was interpreted by myself in the absence of a radiologist:  [x] Radiologist's Report Reviewed:    pending    EKG (if obtained): (All EKG's are interpreted by myself in the absence of a cardiologist)  Initial EKG on my interpretation shows sinus bradycardia with sinus arrhythmia at a rate of 55 bpm with no ST segment elevation. MDM:  Differential diagnosis: ACS, PE, PNA, PTX    ASA given  EKG: no STEMI present  Patient does not PERC out so d-dimer ordered along w cbc/chem/trop/coags/preg    Its is now 7am and shift change. The patient's work-up is not yet completed as labs/imaging still pending. The patient is now signed out to Dr. Louie Nuñez. Please see his note for further documentation regarding medical decision-making/disposition. Old records reviewed. Labs and imaging reviewed and results discussed with patient. .        Patient was given scripts for the following medications. I counseled patient how to take these medications.    New Prescriptions    No medications on file         CRITICAL CARE TIME   Total Critical Care time was 0 minutes, excluding separately reportable procedures. There was a high probability of clinically significant/life threatening deterioration in the patient's condition which required my urgent intervention.       Clinical Impression:  Chest pain  (Please note that portions of this note may have been completed with a voice recognition program. Efforts were made to edit the dictations but occasionally words are mis-transcribed.)    MD January Prather Si, MD  09/03/20 [id number]

## 2020-09-03 NOTE — ED PROVIDER NOTES
Emergency Department Encounter  Location: [address] [place] [address]    Patient: Viridiana Powell  MRN: [id number]  : 1980  Date of evaluation: 9/3/2020  ED Provider: Jairo Gomez MD    Viridiana Powell was checked out to me by the offgoing team. Please see his/her initial documentation for details of the patient's initial ED presentation, physical exam and completed studies. In brief, Viridiana Powell is a 45 y.o. female that presented to the emergency department with chest pain. Onset last night. Hx normal stress test within 10 years. At time of handoff, patient was pending trponin, d-dimer.     I have reviewed and interpreted all of the currently available lab results and diagnostics from this visit:  Results for orders placed or performed during the hospital encounter of 20   HCG, Quantitative, Pregnancy   Result Value Ref Range    hCG Quant <5.0 <5.0 mIU/mL   CBC Auto Differential   Result Value Ref Range    WBC 7.3 4.0 - 11.0 K/uL    RBC 4.02 4.00 - 5.20 M/uL    Hemoglobin 11.0 (L) 12.0 - 16.0 g/dL    Hematocrit 34.0 (L) 36.0 - 48.0 %    MCV 84.6 80.0 - 100.0 fL    MCH 27.4 26.0 - 34.0 pg    MCHC 32.4 31.0 - 36.0 g/dL    RDW 14.9 12.4 - 15.4 %    Platelets 201 324 - 360 K/uL    MPV 8.2 5.0 - 10.5 fL    Neutrophils % 54.9 %    Lymphocytes % 36.3 %    Monocytes % 7.1 %    Eosinophils % 1.1 %    Basophils % 0.6 %    Neutrophils Absolute 4.0 1.7 - 7.7 K/uL    Lymphocytes Absolute 2.7 1.0 - 5.1 K/uL    Monocytes Absolute 0.5 0.0 - 1.3 K/uL    Eosinophils Absolute 0.1 0.0 - 0.6 K/uL    Basophils Absolute 0.0 0.0 - 0.2 K/uL   Comprehensive Metabolic Panel   Result Value Ref Range    Sodium 139 136 - 145 mmol/L    Potassium 4.0 3.5 - 5.1 mmol/L    Chloride 100 99 - 110 mmol/L    CO2 27 21 - 32 mmol/L    Anion Gap 12 3 - 16    Glucose 115 (H) 70 - 99 mg/dL    BUN 12 7 - 20 mg/dL    CREATININE 0.6 0.6 - 1.1 mg/dL    GFR Non-African American >60 >60    GFR  >60 >60 Calcium 9.5 8.3 - 10.6 mg/dL    Total Protein 7.5 6.4 - 8.2 g/dL    Alb 4.3 3.4 - 5.0 g/dL    Albumin/Globulin Ratio 1.3 1.1 - 2.2    Total Bilirubin <0.2 0.0 - 1.0 mg/dL    Alkaline Phosphatase 91 40 - 129 U/L    ALT 16 10 - 40 U/L    AST 24 15 - 37 U/L    Globulin 3.2 g/dL   D-Dimer, Quantitative   Result Value Ref Range    D-Dimer, Quant <200 0 - 229 ng/mL DDU   Troponin   Result Value Ref Range    Troponin <0.01 <0.01 ng/mL   Urine reflex to culture    Specimen: Urine, clean catch   Result Value Ref Range    Color, UA Yellow Straw/Yellow    Clarity, UA Clear Clear    Glucose, Ur Negative Negative mg/dL    Bilirubin Urine Negative Negative    Ketones, Urine Negative Negative mg/dL    Specific Gravity, UA 1.025 1.005 - 1.030    Blood, Urine MODERATE (A) Negative    pH, UA 6.0 5.0 - 8.0    Protein, UA Negative Negative mg/dL    Urobilinogen, Urine 0.2 <2.0 E.U./dL    Nitrite, Urine Negative Negative    Leukocyte Esterase, Urine SMALL (A) Negative    Microscopic Examination YES     Urine Type Cleancatch     Urine Reflex to Culture Yes    Protime-INR   Result Value Ref Range    Protime 11.9 10.0 - 13.2 sec    INR 1.03 0.86 - 1.14   APTT   Result Value Ref Range    aPTT 29.8 24.2 - 36.2 sec   Microscopic Urinalysis   Result Value Ref Range    Mucus, UA Rare (A) None Seen /LPF    WBC, UA 10-20 (A) 0 - 5 /HPF    RBC, UA 11-20 (A) 0 - 4 /HPF    Epithelial Cells, UA 6-10 (A) 0 - 5 /HPF    Bacteria, UA Rare (A) None Seen /HPF    Trichomonas, UA None Seen None Seen /HPF     Xr Chest (2 Vw)    Result Date: 9/3/2020  EXAMINATION: TWO XRAY VIEWS OF THE CHEST 9/3/2020 6:16 am COMPARISON: None. HISTORY: ORDERING SYSTEM PROVIDED HISTORY: CP TECHNOLOGIST PROVIDED HISTORY: Reason for exam:->CP Reason for Exam: Sharp, mid-sternal chest pain Type of Exam: Initial Additional signs and symptoms: Chest Pain (since 2100 last night) Relevant Medical/Surgical History: Alcohol:Yes.   Hx of a mild heart attack at the age of 17, a breast biopsy and breast lumpectomy. FINDINGS: The cardiac silhouette is enlarged. No focal consolidation or pleural effusion. Mild central pulmonary vascular congestion without overt pulmonary edema. No acute bony abnormality. 1. Enlarged cardiac silhouette. 2. Central pulmonary vascular congestion without overt pulmonary edema. Final ED Course and MDM: In brief, Meg Sanchez is a 45 y.o. female whose care was signed out to me by the outgoing provider. In brief, troponin and d-dimer negative. Negative dimer rules out PE in this otherwise low risk patient. I discussed results with patient and advised second troponin, patient would prefer to follow up with her primary care provider than wait for second lab test. Since first troponin was >6h past onset of pain, I think this is reasonable. Will dc to home, pt will fu with her primary care provider. ED Medication Orders (From admission, onward)    Start Ordered     Status Ordering Provider    09/03/20 0630 09/03/20 0622  aspirin chewable tablet 324 mg  ONCE      Last MAR action:  Given - by Darion Estrada on 09/03/20 at 0634 Eleva Salt          Final Impression      1.  Chest pain, unspecified type        DISPOSITION Decision To Discharge 09/03/2020 07:31:45 AM     (Please note that portions of this note may have been completed with a voice recognition program. Efforts were made to edit the dictations but occasionally words are mis-transcribed.)    Laurie Shah MD  [address] [place] [address]        Laurie Shah MD  09/03/20 9702

## 2020-09-03 NOTE — ED NOTES
States burning type chest pain since last night 2100. Went to bed, but did not feel any better after she woke up. Denies shortness of breath or nausea. States worked out with [employer] 2 days ago and may have overdid it.      Binh Reeves RN  09/03/20 1729

## 2020-09-03 NOTE — ED NOTES
Pt placed on/continued on cardiac monitor and continuous pulse ox - see flowsheet     Riddhi Varela, CATHRYN  09/03/20 8950

## 2020-09-04 LAB
ORGANISM: ABNORMAL
URINE CULTURE, ROUTINE: ABNORMAL
URINE CULTURE, ROUTINE: ABNORMAL

## 2021-03-28 ENCOUNTER — HOSPITAL ENCOUNTER (EMERGENCY)
Age: 41
Discharge: HOME | End: 2021-03-28
Attending: EMERGENCY MEDICINE
Payer: MEDICAID

## 2021-03-28 ENCOUNTER — APPOINTMENT (OUTPATIENT)
Dept: GENERAL RADIOLOGY | Age: 41
End: 2021-03-28
Payer: MEDICAID

## 2021-03-28 VITALS
HEIGHT: 63 IN | SYSTOLIC BLOOD PRESSURE: 144 MMHG | BODY MASS INDEX: 30.47 KG/M2 | DIASTOLIC BLOOD PRESSURE: 90 MMHG | OXYGEN SATURATION: 100 % | HEART RATE: 75 BPM | RESPIRATION RATE: 14 BRPM | TEMPERATURE: 98.7 F | WEIGHT: 171.96 LBS

## 2021-03-28 DIAGNOSIS — E01.0 THYROMEGALY: ICD-10-CM

## 2021-03-28 DIAGNOSIS — R03.0 ELEVATED BLOOD PRESSURE READING: ICD-10-CM

## 2021-03-28 DIAGNOSIS — M54.2 NECK PAIN: Primary | ICD-10-CM

## 2021-03-28 PROCEDURE — 71046 X-RAY EXAM CHEST 2 VIEWS: CPT

## 2021-03-28 PROCEDURE — 72040 X-RAY EXAM NECK SPINE 2-3 VW: CPT

## 2021-03-28 PROCEDURE — 99283 EMERGENCY DEPT VISIT LOW MDM: CPT

## 2021-03-28 RX ORDER — METHYLPREDNISOLONE 4 MG/1
TABLET ORAL
Qty: 1 KIT | Refills: 0 | Status: SHIPPED | OUTPATIENT
Start: 2021-03-28 | End: 2021-04-03

## 2021-03-28 RX ORDER — TRAMADOL HYDROCHLORIDE 50 MG/1
50 TABLET ORAL EVERY 6 HOURS PRN
Qty: 12 TABLET | Refills: 0 | Status: SHIPPED | OUTPATIENT
Start: 2021-03-28 | End: 2021-03-31

## 2021-03-28 RX ORDER — BACLOFEN 10 MG/1
10 TABLET ORAL NIGHTLY PRN
Qty: 10 TABLET | Refills: 0 | Status: SHIPPED | OUTPATIENT
Start: 2021-03-28 | End: 2021-10-25

## 2021-03-28 ASSESSMENT — PAIN DESCRIPTION - ONSET: ONSET: ON-GOING

## 2021-03-28 ASSESSMENT — PAIN SCALES - GENERAL: PAINLEVEL_OUTOF10: 7

## 2021-03-28 ASSESSMENT — ENCOUNTER SYMPTOMS
NAUSEA: 0
DIARRHEA: 0
ABDOMINAL PAIN: 0
BACK PAIN: 0
SORE THROAT: 0
WHEEZING: 0
VOMITING: 0
COUGH: 0
EYE PAIN: 0
RHINORRHEA: 0
EYE DISCHARGE: 0
SHORTNESS OF BREATH: 0

## 2021-03-28 ASSESSMENT — PAIN DESCRIPTION - PAIN TYPE: TYPE: ACUTE PAIN

## 2021-03-28 ASSESSMENT — PAIN DESCRIPTION - DESCRIPTORS: DESCRIPTORS: ACHING;SHARP;SORE

## 2021-03-28 ASSESSMENT — PAIN DESCRIPTION - LOCATION: LOCATION: BACK;NECK

## 2021-03-28 NOTE — ED NOTES
Awake alert  Skin warm and dry  Aware how and why to take meds  She said she will have bp rechecked later  Denied having it done now  To return for any changes or concerned  To follow up with Mariela Dominique, RN  03/28/21 9483

## 2021-03-28 NOTE — ED PROVIDER NOTES
[address] [facility]  eMERGENCY dEPARTMENT eNCOUnter        Pt Name: Corinne Fore  MRN: [id number]  [place] 1980  Date of evaluation: 3/28/2021  Provider: Sumeet Pham MD  PCP: [address] [place] [address]       Chief Complaint   Patient presents with    Neck Pain     no known injury  Has been going on for 10 days  Has nodule on thyroid    Back Pain     upper       HISTORY OFPRESENT ILLNESS   (Location/Symptom, Timing/Onset, Context/Setting, Quality, Duration, Modifying Factors,Severity)  Note limiting factors. Corinne Fore is a 45 y.o. 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 She also has a history of a nodule on her left thyroid. She has had this for several years. At one point it was biopsied. She is not had healthcare for the past couple years so has not really followed up on it since then. She does feel as if it got a little bit bigger. However her pain is primarily along the left trapezius and scapular area. She is worried it mean she has pneumonia. Quality: Achy and sore  Duration: 10 days  Modifying Factors: She has no coughing no shortness of breath fever chills nausea vomiting. Hurts when she moves in certain directions. Severity: 7 out of 10    Nursing Noteswere all reviewed and agreed with or any disagreements were addressed  in the HPI. REVIEW OF SYSTEMS    (2-9 systems for level 4, 10 or more for level 5)     Review of Systems   Constitutional: Negative for chills, fatigue and fever. HENT: Negative for ear pain, rhinorrhea and sore throat. Eyes: Negative for pain, discharge and visual disturbance. Respiratory: Negative for cough, shortness of breath and wheezing. Cardiovascular: Negative for chest pain, palpitations and leg swelling.    Gastrointestinal: Negative for abdominal pain, diarrhea, nausea and vomiting. Endocrine:        Positive per HPI   Genitourinary: Negative for difficulty urinating, dysuria, pelvic pain and vaginal discharge. Musculoskeletal: Positive for neck pain. Negative for arthralgias, back pain and joint swelling. Skin: Negative for rash. Allergic/Immunologic: Negative for environmental allergies. Neurological: Negative for dizziness, seizures, syncope and headaches. Hematological: Negative for adenopathy. Psychiatric/Behavioral: Negative for dysphoric mood and suicidal ideas. The patient is not nervous/anxious.           PAST MEDICAL HISTORY     Past Medical History:   Diagnosis Date    Abnormal Pap smear of cervix     Anemia     Bacterial vaginosis     Fibroid     Irregular uterine bleeding     Thyroid enlarged     has nodule on thyroid    Thyroid nodule          SURGICAL HISTORY     Past Surgical History:   Procedure Laterality Date    BREAST BIOPSY      BREAST LUMPECTOMY       SECTION  2011    ENDOMETRIAL ABLATION      MYOMECTOMY  2015    TUBAL LIGATION           CURRENTMEDICATIONS       Previous Medications    MULTIPLE VITAMINS-MINERALS (THERAPEUTIC MULTIVITAMIN-MINERALS) TABLET    Take 1 tablet by mouth daily    OSPEMIFENE 60 MG TABS    Take 1 tablet by mouth daily       ALLERGIES     Dhea [nutritional supplements], Percocet [oxycodone-acetaminophen], and Vicodin [hydrocodone-acetaminophen]    FAMILY HISTORY       Family History   Family history unknown: Yes          SOCIAL HISTORY       Social History     Socioeconomic History    Marital status: Single     Spouse name: Not on file    Number of children: Not on file    Years of education: Not on file    Highest education level: Not on file   Occupational History    Not on file   Social Needs    Financial resource strain: Not on file    Food insecurity     Worry: Not on file     Inability: Not on file    Transportation needs     Medical: Not on file     Non-medical: Not on file   Tobacco Use    Smoking status: Never Smoker    Smokeless tobacco: Never Used   Substance and Sexual Activity    Alcohol use: Yes     Comment: once a month    Drug use: No    Sexual activity: Yes     Partners: Male   Lifestyle    Physical activity     Days per week: Not on file     Minutes per session: Not on file    Stress: Not on file   Relationships    Social connections     Talks on phone: Not on file     Gets together: Not on file     Attends Hindu service: Not on file     Active member of club or organization: Not on file     Attends meetings of clubs or organizations: Not on file     Relationship status: Not on file    Intimate partner violence     Fear of current or ex partner: Not on file     Emotionally abused: Not on file     Physically abused: Not on file     Forced sexual activity: Not on file   Other Topics Concern    Not on file   Social History Narrative    Not on file       SCREENINGS             PHYSICAL EXAM    (up to 7 for level 4, 8 or more for level 5)     ED Triage Vitals [03/28/21 0829]   BP Temp Temp Source Pulse Resp SpO2 Height Weight   (!) 144/90 98.7 °F (37.1 °C) Oral 75 14 100 % 5' 3\" (1.6 m) 171 lb 15.3 oz (78 kg)      height is 5' 3\" (1.6 m) and weight is 171 lb 15.3 oz (78 kg). Her oral temperature is 98.7 °F (37.1 °C). Her blood pressure is 144/90 (abnormal) and her pulse is 75. Her respiration is 14 and oxygen saturation is 100%. Physical Exam  Vitals signs and nursing note reviewed. Constitutional:       Appearance: She is well-developed. She is not diaphoretic. HENT:      Head: Normocephalic and atraumatic. Right Ear: External ear normal.      Left Ear: External ear normal.   Eyes:      General: No scleral icterus. Right eye: No discharge. Left eye: No discharge. Conjunctiva/sclera: Conjunctivae normal.   Neck:      Musculoskeletal: Normal range of motion and neck supple. Pain with movement and muscular tenderness present.       Thyroid: Thyroid mass and thyromegaly present. No thyroid tenderness. Trachea: Trachea and phonation normal. No tracheal deviation. Comments: Nodular left thyroidal gland. Pain is primarily distribution consistent with left trapezius muscle. Cardiovascular:      Rate and Rhythm: Normal rate and regular rhythm. Heart sounds: Normal heart sounds. Pulmonary:      Effort: Pulmonary effort is normal. No respiratory distress. Breath sounds: Normal breath sounds. No stridor. Musculoskeletal: Normal range of motion. Lymphadenopathy:      Cervical: No cervical adenopathy. Skin:     General: Skin is warm and dry. Neurological:      Mental Status: She is alert and oriented to person, place, and time. Coordination: Coordination normal.   Psychiatric:         Behavior: Behavior normal.         DIAGNOSTIC RESULTS   LABS:    No results found for this visit on 03/28/21. All other labs were within normal range or not returned as of this dictation. EKG:  All EKG's are interpreted by the Emergency Department Physician who either signs orCo-signs this chart in the absence of a cardiologist.    None    RADIOLOGY:   Non-plain film images such as CT, Ultrasound and MRI are read by the radiologist. Keiry Bi radiographic images are visualized and preliminarily interpreted by the  EDProvider with the below findings:    Xr Chest (2 Vw)    Result Date: 3/28/2021  EXAMINATION: TWO XRAY VIEWS OF THE CHEST 3/28/2021 8:55 am COMPARISON: 09/03/2020 HISTORY: ORDERING SYSTEM PROVIDED HISTORY: left neck and scapula pain for 10 days TECHNOLOGIST PROVIDED HISTORY: Reason for exam:->left neck and scapula pain for 10 days Reason for Exam: PT. C/O RADIATING PAIN DOWN BACK NECK THAT RADIATES TO MID UPPER BACK X 10 DAYS, DENIES INJURY Acuity: Acute Relevant Medical/Surgical History: HX ENLARGED THYROID, HX NODULE ON THYROID, HX BTREAST LUMPECTOMY, HX BREAST BIOPSY FINDINGS: There is rightward deviation of the cervical trachea, unchanged in the interval, correlating with history of known thyroid enlargement. Cardiac silhouette, mediastinal hilar contours are normal.  No focal airspace disease or pleural effusion is identified. No acute cardiopulmonary disease. Xr Cervical Spine (2-3 Views)    Result Date: 3/28/2021  EXAMINATION: 5 XRAY VIEWS OF THE CERVICAL SPINE 3/28/2021 8:54 am COMPARISON: None. HISTORY: ORDERING SYSTEM PROVIDED HISTORY: left sided neck and scapula pain for 10 days TECHNOLOGIST PROVIDED HISTORY: Reason for exam:->left sided neck and scapula pain for 10 days Reason for Exam: PT. C/O RADIATING PAIN DOWN BACK NECK THAT RADIATES TO  MID UPPER BACK X 10 DAYS, DENIES INJURY Acuity: Acute Type of Exam: Initial Relevant Medical/Surgical History: HX ENLARGED THYROID, HX NODULE ON THYROID FINDINGS: There is mild reverse curvature of the cervical spine at C4-5. No acute fracture is identified. The dens is normal position. At C 4 5 and C5-6 the anterior osteophytes are present. There is mild disc space height loss at C5-6. Rightward deviation of the trachea, associated with known thyroid enlargement. No acute fracture or subluxation. Reverse curvature and disproportionate spondylosis at C4-5 and C5-6. PROCEDURES   Unless otherwise noted below, none     Procedures    CRITICAL CARE TIME   N/A    CONSULTS:  None    EMERGENCY DEPARTMENT COURSE and DIFFERENTIAL DIAGNOSIS/MDM:   Vitals:    Vitals:    03/28/21 0829   BP: (!) 144/90   Pulse: 75   Resp: 14   Temp: 98.7 °F (37.1 °C)   TempSrc: Oral   SpO2: 100%   Weight: 171 lb 15.3 oz (78 kg)   Height: 5' 3\" (1.6 m)       Patient was given the following medications:  Medications - No data to display    Clinically really no concerns for pneumonia. She has no cough no fever no pleuritic component and no shortness of breath. I doubt the thyroid nodule is causing the posterior trapezius muscle pain. FINAL IMPRESSION      1. Neck pain    2. Thyromegaly    3.  Elevated blood pressure reading          DISPOSITION/PLAN    DISPOSITION Decision To Discharge 03/28/2021 09:44:23 AM      PATIENT REFERRED TO:  Nando Us  [address] [facility]  [phone]          DISCHARGE MEDICATIONS:  New Prescriptions    BACLOFEN (LIORESAL) 10 MG TABLET    Take 1 tablet by mouth nightly as needed (neck pain)    METHYLPREDNISOLONE (MEDROL, ALEXUS,) 4 MG TABLET    Take by mouth. TRAMADOL (ULTRAM) 50 MG TABLET    Take 1 tablet by mouth every 6 hours as needed for Pain for up to 3 days. Intended supply: 3 days.  Take lowest dose possible to manage pain       DISCONTINUED MEDICATIONS:  Discontinued Medications    ACETAMINOPHEN (TYLENOL) 325 MG TABLET    Take 2 tablets by mouth every 6 hours as needed for Pain    IBUPROFEN (IBU) 600 MG TABLET    Take 1 tablet by mouth every 8 hours as needed for Pain              (Please note that portions of this note were completed with a voice recognition program.  Efforts were made to editthe dictations but occasionally words are mis-transcribed.)    Laura Mcclendon MD (electronically signed)           Laura Mcclendon MD  03/28/21 1563

## 2021-10-25 ENCOUNTER — HOSPITAL ENCOUNTER (EMERGENCY)
Age: 41
Discharge: HOME | End: 2021-10-25
Payer: MEDICAID

## 2021-10-25 VITALS
DIASTOLIC BLOOD PRESSURE: 95 MMHG | RESPIRATION RATE: 16 BRPM | OXYGEN SATURATION: 96 % | HEART RATE: 82 BPM | BODY MASS INDEX: 30.62 KG/M2 | TEMPERATURE: 98.9 F | HEIGHT: 63 IN | WEIGHT: 172.84 LBS | SYSTOLIC BLOOD PRESSURE: 133 MMHG

## 2021-10-25 DIAGNOSIS — R05.9 COUGH: Primary | ICD-10-CM

## 2021-10-25 PROCEDURE — 99283 EMERGENCY DEPT VISIT LOW MDM: CPT

## 2021-10-25 RX ORDER — CETIRIZINE HYDROCHLORIDE 10 MG/1
10 TABLET ORAL DAILY
Qty: 30 TABLET | Refills: 0 | Status: SHIPPED | OUTPATIENT
Start: 2021-10-25 | End: 2021-11-24

## 2021-10-25 RX ORDER — GUAIFENESIN/DEXTROMETHORPHAN 100-10MG/5
5 SYRUP ORAL 3 TIMES DAILY PRN
Qty: 120 ML | Refills: 0 | Status: SHIPPED | OUTPATIENT
Start: 2021-10-25 | End: 2021-11-04

## 2021-10-25 RX ORDER — ALBUTEROL SULFATE 90 UG/1
AEROSOL, METERED RESPIRATORY (INHALATION)
Qty: 18 G | Refills: 1 | Status: SHIPPED | OUTPATIENT
Start: 2021-10-25 | End: 2022-07-10

## 2021-10-25 ASSESSMENT — PAIN SCALES - GENERAL: PAINLEVEL_OUTOF10: 8

## 2021-10-25 ASSESSMENT — PAIN DESCRIPTION - PAIN TYPE: TYPE: ACUTE PAIN

## 2021-10-25 ASSESSMENT — PAIN DESCRIPTION - DESCRIPTORS: DESCRIPTORS: ACHING

## 2021-10-25 ASSESSMENT — PAIN DESCRIPTION - LOCATION: LOCATION: SHOULDER

## 2021-10-25 ASSESSMENT — PAIN DESCRIPTION - ORIENTATION: ORIENTATION: RIGHT

## 2021-10-25 NOTE — ED TRIAGE NOTES
Patient presents to ED complaining of dry cough x 1 week. Patient states she has been having a mold issue in her bathroom and thinks this is related. Patient denies fever, denies loss of taste or smell. Patient reports some intermittent shortness of breath, denies SOB at this time. Patient also reports some right shoulder pain which worsens with coughing. Patient resting in chair, respirations even and easy at this time. No obvious distress.

## 2021-10-25 NOTE — ED NOTES
Patient ambulatory from ED. AVS provided and discussed with patient. All questions answered. Patient verbalizes understanding of discharge instructions. Respirations even and easy. No obvious distress at this time.        Melba Gonzales RN  10/25/21 4913

## 2021-10-25 NOTE — ED PROVIDER NOTES
[address] [facility] [address]  Dept: [phone]  Loc: [address] ENCOUNTER        This patient was not seen or evaluated by the attending physician. I evaluated this patient, the attending physician was available for consultation. CHIEF COMPLAINT    Chief Complaint   Patient presents with    Cough     dry cough x1wk       HPI    Bakari Boudreaux is a 37 y.o. female who presents with dry cough. Onset was about a week ago. The duration has been constant since the onset. The context is a spontaneous onset. The pain severity is 8/10. There are no alleviating factors. Is vaccinated against covid. Is concerned for mold in the house -she states she has had similar symptoms previously when mold was growing in her bathroom. She states that she does not think the maintenance people fixed it appropriately. She has not had any fevers, chills, chest pain, shortness of breath. She has not had any nausea, vomiting, taste or smell abnormalities. She is requesting a refill of her inhaler. She has no further complaints at this time.     REVIEW OF SYSTEMS    Pulmonary: No difficulty breathing or hemoptysis  General: No fevers or syncope  GI: No vomiting or diarrhea  ENT: see HPI, no sore throat    PAST MEDICAL AND SURGICAL HISTORY    Past Medical History:   Diagnosis Date    Abnormal Pap smear of cervix     Anemia     Bacterial vaginosis     Fibroid     Irregular uterine bleeding     Thyroid enlarged     has nodule on thyroid    Thyroid nodule      Past Surgical History:   Procedure Laterality Date    BREAST BIOPSY      BREAST LUMPECTOMY       SECTION      ENDOMETRIAL ABLATION      MYOMECTOMY  2015    TUBAL LIGATION         CURRENT MEDICATIONS  (may include discharge medications prescribed in the ED)  Current Outpatient Rx   Medication Sig Dispense Refill    guaiFENesin-dextromethorphan (ROBITUSSIN DM) 100-10 MG/5ML syrup Take 5 mLs by mouth 3 times daily as needed for Cough 120 mL 0    albuterol sulfate HFA (PROAIR HFA) 108 (90 Base) MCG/ACT inhaler Inhale 1-2 puffs by mouth every 4 hours while awake for 7-10 days then PRN wheezing  Dispense with SPACER and Instruct on use. May sub Ventolin or Proventil as needed per [employer]. 18 g 1    cetirizine (ZYRTEC) 10 MG tablet Take 1 tablet by mouth daily 30 tablet 0    Multiple Vitamins-Minerals (THERAPEUTIC MULTIVITAMIN-MINERALS) tablet Take 1 tablet by mouth daily         ALLERGIES    Allergies   Allergen Reactions    Dhea [Nutritional Supplements] Hives     intrarosa  Vaginal supplement    Percocet [Oxycodone-Acetaminophen] Nausea And Vomiting    Vicodin [Hydrocodone-Acetaminophen] Nausea And Vomiting       FAMILY AND SOCIAL HISTORY    Family History   Family history unknown: Yes     Social History     Socioeconomic History    Marital status: Single     Spouse name: None    Number of children: None    Years of education: None    Highest education level: None   Occupational History    None   Tobacco Use    Smoking status: Never Smoker    Smokeless tobacco: Never Used   Vaping Use    Vaping Use: Never used   Substance and Sexual Activity    Alcohol use: Yes     Comment: once a month    Drug use: No    Sexual activity: Yes     Partners: Male   Other Topics Concern    None   Social History Narrative    None     Social Determinants of Health     Financial Resource Strain:     Difficulty of Paying Living Expenses:    Food Insecurity:     Worried About Running Out of Food in the Last Year:     Ran Out of Food in the Last Year:    Transportation Needs:     Lack of Transportation (Medical):      Lack of Transportation (Non-Medical):    Physical Activity:     Days of Exercise per Week:     Minutes of Exercise per Session:    Stress:     Feeling of Stress :    Social Connections:     Frequency of Communication with Friends and Family:     Frequency of Social Gatherings with Friends and Family:     Attends Alevism Services:     Active Member of Clubs or Organizations:     Attends Club or Organization Meetings:     Marital Status:    Intimate Partner Violence:     Fear of Current or Ex-Partner:     Emotionally Abused:     Physically Abused:     Sexually Abused:        PHYSICAL EXAM    VITAL SIGNS: /88   Pulse 74   Temp 98.9 °F (37.2 °C) (Oral)   Resp 16   Ht 5' 3\" (1.6 m)   Wt 172 lb 13.5 oz (78.4 kg)   SpO2 100%   BMI 30.62 kg/m²   Constitutional:  Well developed, well nourished, no acute distress  Eyes: Sclera nonicteric, conjunctiva normal   Ears: bilateral ear canal appears nonerythematous and the ipsilateral TM appears grey and nonbulging, the mastoid and pinna are without redness or swelling   Throat/Face:  nonerythematous throat, no exudates, no trismus  Neck: Supple, no enlarged tonsillar LAD  Respiratory:  Lungs clear to auscultation bilaterally, no retractions  Cardiovascular:  Regular rate, no murmurs  Musculoskeletal:  No edema   Neurologic: Awake alert and oriented, and no slurred speech  Integument:  Skin is warm and dry, no rash    RADIOLOGY/PROCEDURES    No orders to display       ED COURSE & MEDICAL DECISION MAKING    See chart for details of medications given during the ED stay. Differential diagnoses: Airway Obstruction, Epiglottitis, Retropharyngeal Abscess, Parapharyngeal Abscess, Pneumonia, Hypoxemia, Dehydration, other. Patient is afebrile with no evidence of stridor, drooling, posturing or respiratory distress. Patient may have a cough related to environmental exposure. She'll be started on Zyrtec, given Robitussin-DM, and a refill of her albuterol inhaler. She is given referral information for allergy, and instructed to follow-up with her primary care provider. I instructed the patient to follow up as an outpatient in 2 days.   I instructed the patient to return to the ED immediately for any new or worsening symptoms. The patient verbalizes understanding. FINAL IMPRESSION    1.  Cough        PLAN  Outpatient treatment, discharge instructions, and follow-up (see EMR)    (Please note that this note was completed with a voice recognition program.  Every attempt was made to edit the dictations, but inevitably there remain words that are mis-transcribed.)        ANUJ Rosado  10/25/21 1605 Raman Laurent  10/25/21 6114

## 2022-07-10 ENCOUNTER — HOSPITAL ENCOUNTER (EMERGENCY)
Age: 42
Discharge: HOME | End: 2022-07-10
Attending: EMERGENCY MEDICINE
Payer: MEDICAID

## 2022-07-10 VITALS
TEMPERATURE: 98.6 F | SYSTOLIC BLOOD PRESSURE: 126 MMHG | HEART RATE: 76 BPM | OXYGEN SATURATION: 100 % | DIASTOLIC BLOOD PRESSURE: 82 MMHG | HEIGHT: 63 IN | WEIGHT: 172.18 LBS | RESPIRATION RATE: 16 BRPM | BODY MASS INDEX: 30.51 KG/M2

## 2022-07-10 DIAGNOSIS — R05.9 COUGH: Primary | ICD-10-CM

## 2022-07-10 DIAGNOSIS — E04.1 THYROID NODULE: ICD-10-CM

## 2022-07-10 PROCEDURE — 99283 EMERGENCY DEPT VISIT LOW MDM: CPT

## 2022-07-10 RX ORDER — METHYLPREDNISOLONE 4 MG/1
TABLET ORAL
Qty: 1 KIT | Refills: 0 | Status: SHIPPED | OUTPATIENT
Start: 2022-07-10 | End: 2022-07-16

## 2022-07-10 RX ORDER — ALBUTEROL SULFATE 90 UG/1
2 AEROSOL, METERED RESPIRATORY (INHALATION) EVERY 4 HOURS PRN
Qty: 18 G | Refills: 0 | Status: SHIPPED | OUTPATIENT
Start: 2022-07-10

## 2022-07-10 RX ORDER — IPRATROPIUM BROMIDE AND ALBUTEROL SULFATE 2.5; .5 MG/3ML; MG/3ML
1 SOLUTION RESPIRATORY (INHALATION) EVERY 4 HOURS PRN
Qty: 360 ML | Refills: 5 | Status: SHIPPED | OUTPATIENT
Start: 2022-07-10

## 2022-07-10 RX ORDER — SOFT LENS DISINFECTANT
SOLUTION, NON-ORAL MISCELLANEOUS
Qty: 1 EACH | Refills: 0 | Status: SHIPPED | OUTPATIENT
Start: 2022-07-10

## 2022-07-10 ASSESSMENT — ENCOUNTER SYMPTOMS
BACK PAIN: 0
WHEEZING: 1
VOMITING: 0
DIARRHEA: 0
EYE PAIN: 0
NAUSEA: 0
SORE THROAT: 0
SHORTNESS OF BREATH: 0
RHINORRHEA: 0
COUGH: 1
EYE DISCHARGE: 0
ABDOMINAL PAIN: 0

## 2022-07-10 ASSESSMENT — PAIN - FUNCTIONAL ASSESSMENT: PAIN_FUNCTIONAL_ASSESSMENT: NONE - DENIES PAIN

## 2022-07-10 NOTE — ED PROVIDER NOTES
[id number] [facility]  eMERGENCY dEPARTMENT eNCOUnter        Pt Name: Caprice Ross  MRN: [id number]  [place] 1980  Date of evaluation: 7/10/2022  Provider: Poncho Morrison MD  PCP: [address] [place] [address]       Chief Complaint   Patient presents with    Cough     dry cough acts up after outside, pt has a hx of bronchitis and feels similar. x3 weeks       HISTORY OFPRESENT ILLNESS   (Location/Symptom, Timing/Onset, Context/Setting, Quality, Duration, Modifying Factors,Severity)  Note limiting factors. Caprice Ross is a 37 y.o. female   presents with cough. This has been an ongoing issue for this patient. She has a nodular thyroid more prominent on the left. She has had a few ultrasounds over the years. She states she is currently not on any medication for it. States that she had biopsies that were negative for cancer. She last saw her Dr. Mario Benson over at [facility] for ear nose and throat in November of last year. He set her up for another ultrasound but the patient was not able to go because of work. She does have a history of asthma she states every year at this time it flares up on her. She has been using her child's albuterol inhaler. She had some prednisone that she took but she thought it made it feel like it was worse. In addition, she is moving to [place] next week. Nursing Noteswere all reviewed and agreed with or any disagreements were addressed  in the HPI. REVIEW OF SYSTEMS    (2-9 systems for level 4, 10 or more for level 5)     Review of Systems   Constitutional: Negative for chills, fatigue and fever. HENT: Negative for ear pain, rhinorrhea and sore throat. Eyes: Negative for pain, discharge and visual disturbance. Respiratory: Positive for cough and wheezing. Negative for shortness of breath. Cardiovascular: Negative for chest pain, palpitations and leg swelling.    Gastrointestinal: Negative for abdominal pain, diarrhea, nausea and vomiting. Genitourinary: Negative for difficulty urinating, dysuria, pelvic pain and vaginal discharge. Musculoskeletal: Negative for arthralgias, back pain, joint swelling and neck pain. Skin: Negative for rash. Allergic/Immunologic: Negative for environmental allergies. Neurological: Negative for dizziness, seizures, syncope and headaches. Hematological: Negative for adenopathy. Psychiatric/Behavioral: Negative for dysphoric mood and suicidal ideas. The patient is not nervous/anxious.           PAST MEDICAL HISTORY     Past Medical History:   Diagnosis Date    Abnormal Pap smear of cervix     Anemia     Asthma     Bacterial vaginosis     Fibroid     Irregular uterine bleeding     Thyroid enlarged     has nodule on thyroid    Thyroid nodule          SURGICAL HISTORY     Past Surgical History:   Procedure Laterality Date    BREAST BIOPSY      BREAST LUMPECTOMY       SECTION  2011    ENDOMETRIAL ABLATION      MYOMECTOMY  2015    TUBAL LIGATION           [address]       Discharge Medication List as of 7/10/2022  2:24 PM      CONTINUE these medications which have NOT CHANGED    Details   Multiple Vitamins-Minerals (THERAPEUTIC MULTIVITAMIN-MINERALS) tablet Take 1 tablet by mouth dailyHistorical Med             ALLERGIES     Dhea [nutritional supplements], Percocet [oxycodone-acetaminophen], and Vicodin [hydrocodone-acetaminophen]    FAMILY HISTORY       Family History   Family history unknown: Yes          SOCIAL HISTORY       Social History     Socioeconomic History    Marital status: Single     Spouse name: None    Number of children: None    Years of education: None    Highest education level: None   Occupational History    None   Tobacco Use    Smoking status: Never Smoker    Smokeless tobacco: Never Used   Vaping Use    Vaping Use: Never used   Substance and Sexual Activity    Alcohol use: Yes     Comment: once a month    Drug use: No    Sexual activity: Yes     Partners: Male   Other Topics Concern    None   Social History Narrative    None     Social Determinants of Health     Financial Resource Strain:     Difficulty of Paying Living Expenses: Not on file   Food Insecurity:     Worried About Running Out of Food in the Last Year: Not on file    Karen of Food in the Last Year: Not on file   Transportation Needs:     Lack of Transportation (Medical): Not on file    Lack of Transportation (Non-Medical): Not on file   Physical Activity:     Days of Exercise per Week: Not on file    Minutes of Exercise per Session: Not on file   Stress:     Feeling of Stress : Not on file   Social Connections:     Frequency of Communication with Friends and Family: Not on file    Frequency of Social Gatherings with Friends and Family: Not on file    Attends Latter-day Services: Not on file    Active Member of [address] [place] [address] [employer] or Organizations: Not on file    Attends Club or Organization Meetings: Not on file    Marital Status: Not on file   Intimate Partner Violence:     Fear of Current or Ex-Partner: Not on file    Emotionally Abused: Not on file    Physically Abused: Not on file    Sexually Abused: Not on file   Housing Stability:     Unable to Pay for Housing in the Last Year: Not on file    Number of [place] in the Last Year: Not on file    Unstable Housing in the Last Year: Not on file       SCREENINGS    [place] Coma Scale  Eye Opening: Spontaneous  Best Verbal Response: Oriented  Best Motor Response: Obeys commands  Rod Coma Scale Score: 15        PHYSICAL EXAM    (up to 7 for level 4, 8 or more for level 5)     ED Triage Vitals [07/10/22 1313]   BP Temp Temp Source Heart Rate Resp SpO2 Height Weight   126/82 98.6 °F (37 °C) Oral 76 16 100 % 5' 3\" (1.6 m) 172 lb 2.9 oz (78.1 kg)      height is 5' 3\" (1.6 m) and weight is 172 lb 2.9 oz (78.1 kg). Her oral temperature is 98.6 °F (37 °C).  Her blood pressure is 126/82 and her pulse is 76. Her respiration is 16 and oxygen saturation is 100%. Physical Exam  Constitutional:       Appearance: She is well-developed. She is not diaphoretic. HENT:      Head: Normocephalic and atraumatic. Right Ear: External ear normal.      Left Ear: External ear normal.   Eyes:      General: No scleral icterus. Right eye: No discharge. Left eye: No discharge. Neck:      Thyroid: Thyromegaly present. Vascular: No JVD. Trachea: No tracheal deviation. Cardiovascular:      Rate and Rhythm: Normal rate and regular rhythm. Heart sounds: No murmur heard. No friction rub. No gallop. Pulmonary:      Effort: Pulmonary effort is normal. No respiratory distress. Breath sounds: Normal breath sounds. No stridor. No wheezing or rales. Abdominal:      General: There is no distension. Palpations: Abdomen is soft. Tenderness: There is no abdominal tenderness. There is no guarding or rebound. Musculoskeletal:         General: No tenderness. Cervical back: Normal range of motion. Skin:     General: Skin is warm and dry. Findings: No rash (On exposed body surfaces). Neurological:      Mental Status: She is alert and oriented to person, place, and time. Coordination: Coordination normal.   Psychiatric:         Behavior: Behavior normal.         Thought Content: Thought content normal.         DIAGNOSTIC RESULTS   LABS:    No results found for this visit on 07/10/22. All other labs were within normal range or not returned as of this dictation. EKG:  All EKG's are interpreted by the Emergency Department Physician who either signs orCo-signs this chart in the absence of a cardiologist.    None    RADIOLOGY:   plain film images such as CT, Ultrasound and MRI are read by the radiologist. Plain radiographic images are visualized and preliminarily interpreted by the  EDProvider with the below findings:    None        PROCEDURES   Unless otherwise noted below, none     Procedures    CRITICAL CARE TIME   N/A    CONSULTS:  None    EMERGENCY DEPARTMENT COURSE and DIFFERENTIAL DIAGNOSIS/MDM:   Vitals:    Vitals:    07/10/22 1313   BP: 126/82   Pulse: 76   Resp: 16   Temp: 98.6 °F (37 °C)   TempSrc: Oral   SpO2: 100%   Weight: 172 lb 2.9 oz (78.1 kg)   Height: 5' 3\" (1.6 m)       Patient was given the following medications:  Medications - No data to display    Quite stable. She does need ear nose and throat follow-up. She does feel she tolerates a Medrol Dosepak better than prednisone. I also did write for a nebulizer machine for her. Otherwise in terms of getting her follow-up she is moving to another [place] in a [place] so we difficult for me to arrange that here. Is this patient to be included in the SEP-1 Core Measure due to severe sepsis or septic shock? No   Exclusion criteria - the patient is NOT to be included for SEP-1 Core Measure due to: Infection is not suspected    FINAL IMPRESSION      1. Cough    2. Thyroid nodule          DISPOSITION/PLAN   DISPOSITION Decision To Discharge 07/10/2022 02:19:05 PM      PATIENT REFERRED TO:  No follow-up provider specified.     DISCHARGE MEDICATIONS:  Discharge Medication List as of 7/10/2022  2:24 PM      START taking these medications    Details   methylPREDNISolone (MEDROL, ALEXUS,) 4 MG tablet Take by mouth., Disp-1 kit, R-0Print      Respiratory Therapy Supplies (NEBULIZER) ERNETSINA Disp-1 each, R-0, PrintUsed to give yourself breathing treatment      ipratropium-albuterol (DUONEB) 0.5-2.5 (3) MG/3ML SOLN nebulizer solution Inhale 3 mLs into the lungs every 4 hours as needed for Shortness of Breath (wheezing coughing), Disp-360 mL, R-5Print             DISCONTINUED MEDICATIONS:  Discharge Medication List as of 7/10/2022  2:24 PM      STOP taking these medications       ibuprofen (IBU) 600 MG tablet Comments:   Reason for Stopping:         acetaminophen (TYLENOL) 325 MG tablet Comments:   Reason for Stopping:

## 2022-07-10 NOTE — ED NOTES
Pt c/o dry cough x3 weeks, pt states its worse after being outside. Pt has a hx of bronchitis and this feels similar. Resp easy and even. Oxygen saturation 100% on RA during triage.         Melody Che RN  07/10/22 0015